# Patient Record
Sex: MALE | Race: AMERICAN INDIAN OR ALASKA NATIVE
[De-identification: names, ages, dates, MRNs, and addresses within clinical notes are randomized per-mention and may not be internally consistent; named-entity substitution may affect disease eponyms.]

---

## 2017-06-16 ENCOUNTER — HOSPITAL ENCOUNTER (OUTPATIENT)
Dept: HOSPITAL 5 - MRI | Age: 52
Discharge: HOME | End: 2017-06-16
Attending: PSYCHIATRY & NEUROLOGY
Payer: COMMERCIAL

## 2017-06-16 DIAGNOSIS — M51.27: ICD-10-CM

## 2017-06-16 DIAGNOSIS — M54.17: ICD-10-CM

## 2017-06-16 DIAGNOSIS — I10: ICD-10-CM

## 2017-06-16 DIAGNOSIS — M51.26: Primary | ICD-10-CM

## 2017-06-16 PROCEDURE — 72148 MRI LUMBAR SPINE W/O DYE: CPT

## 2017-06-17 NOTE — MAGNETIC RESONANCE REPORT
MRI LUMBAR SPINE WITHOUT CONTRAST: 06/16/17



CLINICAL: Lumbar radiculopathy.





TECHNIQUE: Sagittal and axial T1 and T2, and sagittal STIR sequences on 

a 1.5 Desirae magnet.  



FINDINGS: Normal vertebral body height, alignment and disc spaces.  

Normal marrow signal.  Decreased T2 disc signal at L4-5 and L5-S1.  The 

conus medullaris is normal and terminates at L1-2.



L1-2: Intact.



L2-3: Intact.



L3-4: Intact.



L4-5: Moderate broad-based central disc protrusion and asymmetric 

protrusion into the right neural foramen producing moderate right 

neural foraminal stenosis.  No left neural foraminal stenosis.  

Bilateral facet hypertrophy.



L5-S1: Small central annular tear.  A broad-based central disc 

protrusion and asymmetric left foraminal disc protrusion produces 

moderate left neural foraminal narrowing.



IMPRESSION: Disc protrusions with significant neural foraminal 

narrowing at L4-5 and L5-S1.

## 2018-03-19 ENCOUNTER — HOSPITAL ENCOUNTER (EMERGENCY)
Dept: HOSPITAL 5 - ED | Age: 53
Discharge: HOME | End: 2018-03-19
Payer: COMMERCIAL

## 2018-03-19 VITALS — DIASTOLIC BLOOD PRESSURE: 75 MMHG | SYSTOLIC BLOOD PRESSURE: 139 MMHG

## 2018-03-19 DIAGNOSIS — I10: ICD-10-CM

## 2018-03-19 DIAGNOSIS — E11.9: ICD-10-CM

## 2018-03-19 DIAGNOSIS — M54.12: Primary | ICD-10-CM

## 2018-03-19 PROCEDURE — 99282 EMERGENCY DEPT VISIT SF MDM: CPT

## 2018-03-19 NOTE — EMERGENCY DEPARTMENT REPORT
ED General Adult HPI





- General


Chief complaint: Neuro Symptoms/Deficit


Stated complaint: NUMB/TINGLES LEFT SIDE NECK HEAD


Time Seen by Provider: 03/19/18 11:29


Source: patient, RN notes reviewed


Mode of arrival: Ambulatory


Limitations: No Limitations





- History of Present Illness


Initial comments: 








This is a 52-year-old male.  The patient is previously on known to this 

provider.  His neurologist is Dr. SHELBY OGDEN





Past medical history includes lumbar disc diseas Bell's palsy which has since 

resolved, and obesity


The patient presents to the ER with a complaint of nontraumatic left sided 

paracervical neck pain.  It is described as sharp in nature, occasionally 

radiates down the left upper extremity, and up the left side of the face.  

Patient denies weakness.  He denies bladder/bowel retention and incontinence.  

There is no saddle anesthesia.  He reports multiple episodes of spasms.  These 

episodes do not have any exacerbating or relieving factors.  He has no other 

complaints.














,





-: Gradual, days(s)


Location: neck, left, upper extremity


Severity scale (0 -10): 0


Quality: other (BURNING AND spasming)


Consistency: intermittent


Improves with: none


Worsens with: none


Associated Symptoms: denies other symptoms





- Related Data


 Previous Rx's











 Medication  Instructions  Recorded  Last Taken  Type


 


predniSONE [Deltasone] 60 mg PO QDAY #24 tab 07/11/15 Unknown Rx


 


valACYclovir [Valtrex] 500 mg PO BID #10 tab 07/11/15 Unknown Rx


 


Diazepam Tab [Valium] 2 mg PO TID PRN #15 tablet 03/19/18 Unknown Rx


 


Ibuprofen [Motrin] 600 mg PO Q8H PRN #30 tablet 03/19/18 Unknown Rx











 Allergies











Allergy/AdvReac Type Severity Reaction Status Date / Time


 


No Known Allergies Allergy   Unverified 05/12/15 07:50














ED Review of Systems


ROS: 


Stated complaint: NUMB/TINGLES LEFT SIDE NECK HEAD


Other details as noted in HPI





Comment: All other systems reviewed and negative





ED Past Medical Hx





- Past Medical History


Previous Medical History?: Yes


Hx Hypertension: Yes


Hx Diabetes: Yes


Additional medical history: chol





- Surgical History


Past Surgical History?: Yes


Additional Surgical History: empty gastric study





- Social History


Smoking Status: Never Smoker





- Medications


Home Medications: 


 Home Medications











 Medication  Instructions  Recorded  Confirmed  Last Taken  Type


 


predniSONE [Deltasone] 60 mg PO QDAY #24 tab 07/11/15  Unknown Rx


 


valACYclovir [Valtrex] 500 mg PO BID #10 tab 07/11/15  Unknown Rx


 


Diazepam Tab [Valium] 2 mg PO TID PRN #15 tablet 03/19/18  Unknown Rx


 


Ibuprofen [Motrin] 600 mg PO Q8H PRN #30 tablet 03/19/18  Unknown Rx














ED Physical Exam





- General


Limitations: No Limitations


General appearance: alert, in no apparent distress





- Head


Head exam: Present: atraumatic, normocephalic





- Eye


Eye exam: Present: normal appearance, EOMI.  Absent: nystagmus





- ENT


ENT exam: Present: normal exam, normal orophraynx, mucous membranes moist, 

normal external ear exam





- Neck


Neck exam: Present: normal inspection, full ROM, other (there is no midline 

spinal tenderness.).  Absent: tenderness, meningismus





- Respiratory


Respiratory exam: Present: normal lung sounds bilaterally.  Absent: respiratory 

distress





- Cardiovascular


Cardiovascular Exam: Present: regular rate, normal rhythm, normal heart sounds.

  Absent: systolic murmur, diastolic murmur, rubs, gallop





- GI/Abdominal


GI/Abdominal exam: Present: soft, normal bowel sounds.  Absent: distended, 

tenderness, guarding, pulsatile mass





- Rectal


Rectal exam: Present: deferred





- Extremities Exam


Extremities exam: Present: normal inspection, full ROM, normal capillary 

refill.  Absent: tenderness, pedal edema, joint swelling, calf tenderness





- Back Exam


Back exam: Present: normal inspection, full ROM.  Absent: tenderness, CVA 

tenderness (R), paraspinal tenderness, vertebral tenderness





- Neurological Exam


Neurological exam: Present: alert, oriented X3, CN II-XII intact, normal gait, 

reflexes normal, other (sensation intact to pinprick, proprioception, light 

touch in the bilateral upper and lower extremities.  Downgoing plantar reflexes 

noted in the bilateral lower extremities.  2+  biceps, triceps reflexes in the 

bilateral upper extremities.).  Absent: motor sensory deficit





- Psychiatric


Psychiatric exam: Present: normal affect, normal mood





- Skin


Skin exam: Present: warm, dry, intact, normal color.  Absent: rash





ED Course





 Vital Signs











  03/19/18 03/19/18 03/19/18





  11:12 11:44 11:46


 


Temperature 98.8 F  


 


Pulse Rate 88  95 H


 


Respiratory 16 22 22





Rate   


 


Blood Pressure 162/99  


 


Blood Pressure   139/75





[Right]   


 


O2 Sat by Pulse 95 92 92





Oximetry   














  03/19/18





  11:47


 


Temperature 


 


Pulse Rate 95 H


 


Respiratory 





Rate 


 


Blood Pressure 


 


Blood Pressure 





[Right] 


 


O2 Sat by Pulse 





Oximetry 














ED Medical Decision Making





- Lab Data








 Vital Signs











  03/19/18 03/19/18 03/19/18





  11:12 11:44 11:46


 


Temperature 98.8 F  


 


Pulse Rate 88  95 H


 


Respiratory 16 22 22





Rate   


 


Blood Pressure 162/99  


 


Blood Pressure   139/75





[Right]   


 


O2 Sat by Pulse 95 92 92





Oximetry   














  03/19/18





  11:47


 


Temperature 


 


Pulse Rate 95 H


 


Respiratory 





Rate 


 


Blood Pressure 


 


Blood Pressure 





[Right] 


 


O2 Sat by Pulse 





Oximetry 














- Medical Decision Making





Differential diagnosis, including but not limited to:


Cervical radiculopathy, trigeminal neuralgia, multiple sclerosis











Assessment and plan: 52-year-old male with nonspecific pain, numbness and 

radiation, most likely cervical radiculopathy, has no clinical indication of 

cervical compression syndrome at this time, 5 out of 5 strength in the 

bilateral upper and lower extremities, sensation is intact to light touch, 2. 

discrimination and proprioception, along with appropriate reflexes at this 

time.  The patient is medically suitable at this time to follow-up with his 

outpatient neurologistfor Outpatient evaluation and workup, he does not require 

any emergent imaging or further evaluation at this time in the emergency 

department.  Return precautions are reviewed.








Critical care attestation.: 


If time is entered above; I have spent that time in minutes in the direct care 

of this critically ill patient, excluding procedure time.








ED Disposition


Clinical Impression: 


Radiculopathy


Qualifiers:


 Spinal region: unspecified Qualified Code(s): M54.10 - Radiculopathy, site 

unspecified





Disposition: DC-01 TO HOME OR SELFCARE


Is pt being admited?: No


Does the pt Need Aspirin: No


Condition: Good


Instructions:  Cervical Radiculopathy (ED)


Additional Instructions: 


Follow-up with your neurologist within the next week.  Return to the ER right 

away with new pain, worsening pain, migration of pain, weakness, inability to 

move the upper extremity, bladder or bowel retention/incontinence.


Referrals: 


SHELBY OGDEN MD [Staff Physician] - 3-5 Days

## 2019-08-20 ENCOUNTER — HOSPITAL ENCOUNTER (EMERGENCY)
Dept: HOSPITAL 5 - ED | Age: 54
Discharge: HOME | End: 2019-08-20
Payer: COMMERCIAL

## 2019-08-20 VITALS — SYSTOLIC BLOOD PRESSURE: 165 MMHG | DIASTOLIC BLOOD PRESSURE: 85 MMHG

## 2019-08-20 DIAGNOSIS — E11.9: ICD-10-CM

## 2019-08-20 DIAGNOSIS — M19.90: ICD-10-CM

## 2019-08-20 DIAGNOSIS — R10.30: Primary | ICD-10-CM

## 2019-08-20 DIAGNOSIS — I10: ICD-10-CM

## 2019-08-20 DIAGNOSIS — Z79.899: ICD-10-CM

## 2019-08-20 LAB
ALBUMIN SERPL-MCNC: 4 G/DL (ref 3.9–5)
ALT SERPL-CCNC: 27 UNITS/L (ref 7–56)
BASOPHILS # (AUTO): 0.1 K/MM3 (ref 0–0.1)
BASOPHILS NFR BLD AUTO: 1 % (ref 0–1.8)
BUN SERPL-MCNC: 11 MG/DL (ref 9–20)
BUN/CREAT SERPL: 14 %
CALCIUM SERPL-MCNC: 9.1 MG/DL (ref 8.4–10.2)
EOSINOPHIL # BLD AUTO: 0.2 K/MM3 (ref 0–0.4)
EOSINOPHIL NFR BLD AUTO: 1.6 % (ref 0–4.3)
HCT VFR BLD CALC: 41 % (ref 35.5–45.6)
HEMOLYSIS INDEX: 4
HGB BLD-MCNC: 13.7 GM/DL (ref 11.8–15.2)
LYMPHOCYTES # BLD AUTO: 1.7 K/MM3 (ref 1.2–5.4)
LYMPHOCYTES NFR BLD AUTO: 17.3 % (ref 13.4–35)
MCHC RBC AUTO-ENTMCNC: 34 % (ref 32–34)
MCV RBC AUTO: 80 FL (ref 84–94)
MONOCYTES # (AUTO): 0.8 K/MM3 (ref 0–0.8)
MONOCYTES % (AUTO): 8.2 % (ref 0–7.3)
PLATELET # BLD: 273 K/MM3 (ref 140–440)
RBC # BLD AUTO: 5.1 M/MM3 (ref 3.65–5.03)

## 2019-08-20 PROCEDURE — 83690 ASSAY OF LIPASE: CPT

## 2019-08-20 PROCEDURE — 96375 TX/PRO/DX INJ NEW DRUG ADDON: CPT

## 2019-08-20 PROCEDURE — 76705 ECHO EXAM OF ABDOMEN: CPT

## 2019-08-20 PROCEDURE — 80053 COMPREHEN METABOLIC PANEL: CPT

## 2019-08-20 PROCEDURE — 93005 ELECTROCARDIOGRAM TRACING: CPT

## 2019-08-20 PROCEDURE — 96374 THER/PROPH/DIAG INJ IV PUSH: CPT

## 2019-08-20 PROCEDURE — 99284 EMERGENCY DEPT VISIT MOD MDM: CPT

## 2019-08-20 PROCEDURE — 84484 ASSAY OF TROPONIN QUANT: CPT

## 2019-08-20 PROCEDURE — 85025 COMPLETE CBC W/AUTO DIFF WBC: CPT

## 2019-08-20 PROCEDURE — 93010 ELECTROCARDIOGRAM REPORT: CPT

## 2019-08-20 PROCEDURE — 36415 COLL VENOUS BLD VENIPUNCTURE: CPT

## 2019-08-20 NOTE — ULTRASOUND REPORT
ULTRASOUND ABDOMEN, LIMITED (RIGHT UPPER QUADRANT)



INDICATION:

Right upper quadrant pain and nausea since yesterday.



COMPARISON:

7/11/2015.



FINDINGS:

PANCREAS: Not well seen due to bowel gas.

LIVER: There is a moderate diffuse increased echogenicity of the hepatic parenchyma without focal les
ion. 

GALLBLADDER: Suboptimally evaluated due to bowel gas and body habitus. No wall thickening or gallston
es are identified. 

BILE DUCTS: No significant abnormality. Common bile duct measures 4.8 mm. 



FREE FLUID: None.

ADDITIONAL FINDINGS: Images of the right kidney are normal.



IMPRESSION:

1. Moderate diffuse fatty infiltration of the liver. 

2. Suboptimal visualization the gallbladder without visible stones.



Signer Name: Faisal Lua MD 

Signed: 8/20/2019 7:44 AM

 Workstation Name: [email or web address]

## 2019-08-20 NOTE — EMERGENCY DEPARTMENT REPORT
ED General Adult HPI





- General


Chief complaint: Abdominal Pain


Stated complaint: ABD PAIN


Time Seen by Provider: 08/20/19 06:08


Source: patient


Mode of arrival: Ambulatory


Limitations: No Limitations





- History of Present Illness


Initial comments: 





Patient presents to the emergency department with a chief complaint of lower 

part of abdominal pain times one day.  Patient describes the pain as sharp in 

nature and denies any radiation.  Patient doesn't cause nausea but denies 

vomiting.  Patient denies chest pain, shortness breath, or headache.


-: Sudden


Location: abdomen


Radiation: non-radiation


Severity scale (0 -10): 5


Quality: sharp


Consistency: constant


Improves with: none


Worsens with: none


Associated Symptoms: denies other symptoms


Treatments Prior to Arrival: none





- Related Data


                                  Previous Rx's











 Medication  Instructions  Recorded  Last Taken  Type


 


predniSONE [Deltasone] 60 mg PO QDAY #24 tab 07/11/15 Unknown Rx


 


valACYclovir [Valtrex] 500 mg PO BID #10 tab 07/11/15 Unknown Rx


 


Ibuprofen [Motrin] 600 mg PO Q8H PRN #30 tablet 03/19/18 Unknown Rx


 


diazePAM TAB [Valium] 2 mg PO TID PRN #15 tablet 03/19/18 Unknown Rx


 


Dicyclomine [Bentyl] 10 mg PO QID PRN #20 capsule 08/20/19 Unknown Rx


 


Ondansetron [Zofran Odt] 4 mg PO Q4HR PRN #20 tab.rapdis 08/20/19 Unknown Rx











                                    Allergies











Allergy/AdvReac Type Severity Reaction Status Date / Time


 


No Known Allergies Allergy   Unverified 05/12/15 07:50














ED Review of Systems


ROS: 


Stated complaint: ABD PAIN


Other details as noted in HPI





Comment: All other systems reviewed and negative


Constitutional: denies: chills, fever


Eyes: denies: eye pain, eye discharge, vision change


ENT: denies: ear pain, throat pain


Respiratory: denies: cough, shortness of breath, wheezing


Cardiovascular: denies: chest pain, palpitations


Endocrine: no symptoms reported


Gastrointestinal: abdominal pain.  denies: nausea, diarrhea


Genitourinary: denies: urgency, dysuria


Musculoskeletal: denies: back pain, joint swelling, arthralgia


Skin: denies: rash, lesions


Neurological: denies: headache, weakness, paresthesias


Psychiatric: denies: anxiety, depression


Hematological/Lymphatic: denies: easy bleeding, easy bruising





ED Past Medical Hx





- Past Medical History


Hx Hypertension: Yes


Hx CVA: No


Hx Heart Attack/AMI: No


Hx Congestive Heart Failure: No


Hx Diabetes: Yes


Hx Deep Vein Thrombosis: No


Hx Pulmonary Embolism: No


Hx of Cancer: No


Hx Sickle Cell Disease: No


Hx Arthritis: Yes


Hx Seizures: No


Hx Psychiatric Treatment: Yes


Hx Asthma: No


Hx COPD: No


Hx Dementia: No


Hx HIV: No


Additional medical history: chol





- Surgical History


Additional Surgical History: pinched nerve, colon mass





- Social History


Smoking Status: Never Smoker





- Medications


Home Medications: 


                                Home Medications











 Medication  Instructions  Recorded  Confirmed  Last Taken  Type


 


predniSONE [Deltasone] 60 mg PO QDAY #24 tab 07/11/15  Unknown Rx


 


valACYclovir [Valtrex] 500 mg PO BID #10 tab 07/11/15  Unknown Rx


 


Ibuprofen [Motrin] 600 mg PO Q8H PRN #30 tablet 03/19/18  Unknown Rx


 


diazePAM TAB [Valium] 2 mg PO TID PRN #15 tablet 03/19/18  Unknown Rx


 


Dicyclomine [Bentyl] 10 mg PO QID PRN #20 capsule 08/20/19  Unknown Rx


 


Ondansetron [Zofran Odt] 4 mg PO Q4HR PRN #20 tab.rapdis 08/20/19  Unknown Rx














ED Physical Exam





- General


Limitations: No Limitations


General appearance: alert, in no apparent distress





- Head


Head exam: Present: atraumatic, normocephalic





- Eye


Eye exam: Present: normal appearance, PERRL, EOMI





- ENT


ENT exam: Present: mucous membranes moist





- Neck


Neck exam: Present: normal inspection





- Respiratory


Respiratory exam: Present: normal lung sounds bilaterally.  Absent: respiratory 

distress





- Cardiovascular


Cardiovascular Exam: Present: regular rate, normal rhythm.  Absent: systolic 

murmur, diastolic murmur, rubs, gallop





- GI/Abdominal


GI/Abdominal exam: Present: soft, tenderness (TTP RUQ), normal bowel sounds.  

Absent: distended





- Rectal


Rectal exam: Present: deferred





- Extremities Exam


Extremities exam: Present: normal inspection





- Back Exam


Back exam: Present: normal inspection





- Neurological Exam


Neurological exam: Present: alert, oriented X3, CN II-XII intact.  Absent: motor

sensory deficit





- Psychiatric


Psychiatric exam: Present: normal affect, normal mood





- Skin


Skin exam: Present: warm, dry, intact, normal color.  Absent: rash





ED Course


                                   Vital Signs











  08/20/19 08/20/19 08/20/19





  04:10 04:50 05:00


 


Temperature 98.3 F 98.7 F 


 


Pulse Rate 92 H 87 


 


Respiratory 22 16 





Rate   


 


Blood Pressure 153/94  170/97


 


Blood Pressure  170/87 





[Left]   


 


O2 Sat by Pulse 93 93 83 L





Oximetry   














  08/20/19 08/20/19 08/20/19





  06:00 06:50 07:35


 


Temperature   98.5 F


 


Pulse Rate   87


 


Respiratory  20 21





Rate   


 


Blood Pressure 155/87  


 


Blood Pressure   129/68





[Left]   


 


O2 Sat by Pulse 95  94





Oximetry   














ED Medical Decision Making





- Lab Data


Result diagrams: 


                                 08/20/19 04:16





                                 08/20/19 04:16








                                   Lab Results











  08/20/19 08/20/19 08/20/19 Range/Units





  04:16 04:16 06:42 


 


WBC  9.9    (4.5-11.0)  K/mm3


 


RBC  5.10 H    (3.65-5.03)  M/mm3


 


Hgb  13.7    (11.8-15.2)  gm/dl


 


Hct  41.0    (35.5-45.6)  %


 


MCV  80 L    (84-94)  fl


 


MCH  27 L    (28-32)  pg


 


MCHC  34    (32-34)  %


 


RDW  16.4 H    (13.2-15.2)  %


 


Plt Count  273    (140-440)  K/mm3


 


Lymph % (Auto)  17.3    (13.4-35.0)  %


 


Mono % (Auto)  8.2 H    (0.0-7.3)  %


 


Eos % (Auto)  1.6    (0.0-4.3)  %


 


Baso % (Auto)  1.0    (0.0-1.8)  %


 


Lymph #  1.7    (1.2-5.4)  K/mm3


 


Mono #  0.8    (0.0-0.8)  K/mm3


 


Eos #  0.2    (0.0-0.4)  K/mm3


 


Baso #  0.1    (0.0-0.1)  K/mm3


 


Seg Neutrophils %  71.9 H    (40.0-70.0)  %


 


Seg Neutrophils #  7.1    (1.8-7.7)  K/mm3


 


Sodium   140   (137-145)  mmol/L


 


Potassium   3.5 L   (3.6-5.0)  mmol/L


 


Chloride   98.4   ()  mmol/L


 


Carbon Dioxide   29   (22-30)  mmol/L


 


Anion Gap   16   mmol/L


 


BUN   11   (9-20)  mg/dL


 


Creatinine   0.8   (0.8-1.5)  mg/dL


 


Estimated GFR   > 60   ml/min


 


BUN/Creatinine Ratio   14   %


 


Glucose   176 H   ()  mg/dL


 


Calcium   9.1   (8.4-10.2)  mg/dL


 


Total Bilirubin   0.50   (0.1-1.2)  mg/dL


 


AST   27   (5-40)  units/L


 


ALT   27   (7-56)  units/L


 


Alkaline Phosphatase   74   ()  units/L


 


Troponin T   < 0.010  < 0.010  (0.00-0.029)  ng/mL


 


Total Protein   7.2   (6.3-8.2)  g/dL


 


Albumin   4.0   (3.9-5)  g/dL


 


Albumin/Globulin Ratio   1.3   %


 


Lipase   18   (13-60)  units/L














- Radiology Data


Radiology results: report reviewed





- Medical Decision Making





The patient's O2 sats were 89% on room air.  Patient states he has a history of 

sleep apnea and is now wearing his CPAP and denies COPD.  Patient was asleep 

when I entered the room.  This also increased to 95% on room air after awakening

 the patient


Discussed results with patient 


Critical care attestation.: 


If time is entered above; I have spent that time in minutes in the direct care 

of this critically ill patient, excluding procedure time.








ED Disposition


Clinical Impression: 


 Abdominal pain





Disposition: DC-01 TO HOME OR SELFCARE


Is pt being admited?: No


Does the pt Need Aspirin: No


Condition: Stable


Instructions:  Acute Abdominal Pain (ED)


Additional Instructions: 


Return if worse


Referrals: 


LELE PERLA MD [Primary Care Provider] - 3-5 Days


Time of Disposition: 08:26

## 2022-08-03 ENCOUNTER — HOSPITAL ENCOUNTER (OUTPATIENT)
Dept: HOSPITAL 5 - XRAY | Age: 57
Discharge: HOME | End: 2022-08-03
Attending: INTERNAL MEDICINE
Payer: COMMERCIAL

## 2022-08-03 DIAGNOSIS — M47.812: ICD-10-CM

## 2022-08-03 DIAGNOSIS — M47.816: ICD-10-CM

## 2022-08-03 DIAGNOSIS — M19.041: ICD-10-CM

## 2022-08-03 DIAGNOSIS — M19.042: Primary | ICD-10-CM

## 2022-08-03 PROCEDURE — 72100 X-RAY EXAM L-S SPINE 2/3 VWS: CPT

## 2022-08-03 PROCEDURE — 72040 X-RAY EXAM NECK SPINE 2-3 VW: CPT

## 2022-08-03 NOTE — XRAY REPORT
LUMBAR SPINE 3 VIEWS



INDICATION: BACK PAIN



COMPARISON: 7/14/2020



FINDINGS:



No acute, displaced fracture is seen.



There is minimal anterolisthesis of L4 on L5 which may be due to facet arthropathy.



Mild disc space narrowing is again noted at the lower lumbar spine. There is also mild lower lumbar f
acet arthropathy.



IMPRESSION:

1.  No acute findings.

2. Degenerative changes, as above. 



Signer Name: Michael De Jesus MD 

Signed: 8/3/2022 1:14 PM

Workstation Name: [email or web address]

## 2022-08-03 NOTE — XRAY REPORT
AP AND LATERAL VIEWS OF BOTH HANDS



INDICATION / CLINICAL INFORMATION:

BILATERAL HAND PAIN



COMPARISON:

None available.

 

FINDINGS:



BONES / JOINT(S): No acute fracture or subluxation. There is joint space narrowing at the bilateral t
humb MCP joints. Joint spaces are otherwise maintained.

SOFT TISSUES: No significant abnormality.



ADDITIONAL FINDINGS: None.





IMPRESSION:

No acute findings. No radiographic evidence of inflammatory arthropathy.



Signer Name: Michael De Jesus MD 

Signed: 8/3/2022 1:16 PM

Workstation Name: [email or web address]

## 2022-08-03 NOTE — XRAY REPORT
CERVICAL SPINE 3 VIEWS



INDICATION: BACK PAIN



COMPARISON: None.



FINDINGS:



No acute, displaced fracture is seen.



Alignment is within normal limits.



ACDF at C5, C6, C7 has a satisfactory postoperative appearance. There is advanced discogenic degenera
tive change at C3-4 with mild to moderate discogenic degenerative change throughout the remainder.



IMPRESSION:

1.  No acute findings.

2. Degenerative changes, as above. 



Signer Name: Michael De Jesus MD 

Signed: 8/3/2022 1:17 PM

Workstation Name: [email or web address]